# Patient Record
Sex: MALE | Race: WHITE | URBAN - METROPOLITAN AREA
[De-identification: names, ages, dates, MRNs, and addresses within clinical notes are randomized per-mention and may not be internally consistent; named-entity substitution may affect disease eponyms.]

---

## 2018-11-14 ENCOUNTER — FOLLOW UP ESTABLISHED (OUTPATIENT)
Dept: URBAN - METROPOLITAN AREA CLINIC 24 | Facility: CLINIC | Age: 63
End: 2018-11-14
Payer: MEDICARE

## 2018-11-14 DIAGNOSIS — H40.033 ANATOMICAL NARROW ANGLE, BILATERAL: Primary | ICD-10-CM

## 2018-11-14 DIAGNOSIS — H43.812 VITREOUS DEGENERATION, LEFT EYE: ICD-10-CM

## 2018-11-14 PROCEDURE — 92004 COMPRE OPH EXAM NEW PT 1/>: CPT | Performed by: OPTOMETRIST

## 2018-11-14 PROCEDURE — 92083 EXTENDED VISUAL FIELD XM: CPT | Performed by: OPTOMETRIST

## 2018-11-14 PROCEDURE — 92014 COMPRE OPH EXAM EST PT 1/>: CPT | Performed by: OPTOMETRIST

## 2018-11-14 PROCEDURE — 92133 CPTRZD OPH DX IMG PST SGM ON: CPT | Performed by: OPTOMETRIST

## 2018-11-14 RX ORDER — LATANOPROST 50 UG/ML
0.005 % SOLUTION OPHTHALMIC
Qty: 7.5 | Refills: 3 | Status: INACTIVE
Start: 2018-11-14 | End: 2022-03-28

## 2018-11-14 RX ORDER — LATANOPROST 50 UG/ML
0.005 % SOLUTION OPHTHALMIC
Qty: 7.5 | Refills: 3 | Status: INACTIVE
Start: 2018-11-14 | End: 2018-11-14

## 2018-11-14 ASSESSMENT — KERATOMETRY
OS: 41.42
OD: 41.70

## 2018-11-14 ASSESSMENT — INTRAOCULAR PRESSURE
OS: 24
OS: 21
OD: 24
OD: 22

## 2018-11-14 ASSESSMENT — VISUAL ACUITY
OD: 20/20
OS: 20/20

## 2018-12-18 ENCOUNTER — FOLLOW UP ESTABLISHED (OUTPATIENT)
Dept: URBAN - METROPOLITAN AREA CLINIC 24 | Facility: CLINIC | Age: 63
End: 2018-12-18
Payer: MEDICARE

## 2018-12-18 PROCEDURE — 92012 INTRM OPH EXAM EST PATIENT: CPT | Performed by: OPTOMETRIST

## 2018-12-18 ASSESSMENT — INTRAOCULAR PRESSURE
OD: 20
OS: 19
OD: 21
OS: 20

## 2019-06-11 ENCOUNTER — FOLLOW UP ESTABLISHED (OUTPATIENT)
Dept: URBAN - METROPOLITAN AREA CLINIC 24 | Facility: CLINIC | Age: 64
End: 2019-06-11
Payer: MEDICARE

## 2019-06-11 PROCEDURE — 92012 INTRM OPH EXAM EST PATIENT: CPT | Performed by: OPTOMETRIST

## 2019-06-11 ASSESSMENT — INTRAOCULAR PRESSURE
OS: 20
OD: 20

## 2019-12-17 ENCOUNTER — FOLLOW UP ESTABLISHED (OUTPATIENT)
Dept: URBAN - METROPOLITAN AREA CLINIC 24 | Facility: CLINIC | Age: 64
End: 2019-12-17
Payer: MEDICARE

## 2019-12-17 DIAGNOSIS — H43.393 OTHER VITREOUS OPACITIES, BILATERAL: ICD-10-CM

## 2019-12-17 PROCEDURE — 92014 COMPRE OPH EXAM EST PT 1/>: CPT | Performed by: OPTOMETRIST

## 2019-12-17 PROCEDURE — 92133 CPTRZD OPH DX IMG PST SGM ON: CPT | Performed by: OPTOMETRIST

## 2019-12-17 ASSESSMENT — INTRAOCULAR PRESSURE
OS: 20
OS: 19
OD: 20

## 2019-12-17 ASSESSMENT — KERATOMETRY
OD: 41.81
OS: 41.70

## 2020-09-10 ENCOUNTER — FOLLOW UP ESTABLISHED (OUTPATIENT)
Dept: URBAN - METROPOLITAN AREA CLINIC 26 | Facility: CLINIC | Age: 65
End: 2020-09-10
Payer: MEDICARE

## 2020-09-10 PROCEDURE — 92012 INTRM OPH EXAM EST PATIENT: CPT | Performed by: OPTOMETRIST

## 2020-09-10 PROCEDURE — 92083 EXTENDED VISUAL FIELD XM: CPT | Performed by: OPTOMETRIST

## 2020-09-10 ASSESSMENT — INTRAOCULAR PRESSURE
OD: 18
OS: 21

## 2021-03-10 ENCOUNTER — FOLLOW UP ESTABLISHED (OUTPATIENT)
Dept: URBAN - METROPOLITAN AREA CLINIC 26 | Facility: CLINIC | Age: 66
End: 2021-03-10
Payer: COMMERCIAL

## 2021-03-10 DIAGNOSIS — H25.813 COMBINED FORMS OF AGE-RELATED CATARACT, BILATERAL: ICD-10-CM

## 2021-03-10 PROCEDURE — 92134 CPTRZ OPH DX IMG PST SGM RTA: CPT | Performed by: OPTOMETRIST

## 2021-03-10 PROCEDURE — 92133 CPTRZD OPH DX IMG PST SGM ON: CPT | Performed by: OPTOMETRIST

## 2021-03-10 PROCEDURE — 99214 OFFICE O/P EST MOD 30 MIN: CPT | Performed by: OPTOMETRIST

## 2021-03-10 RX ORDER — LATANOPROST 50 UG/ML
0.005 % SOLUTION OPHTHALMIC
Qty: 7.5 | Refills: 3 | Status: INACTIVE
Start: 2021-03-10 | End: 2021-12-21

## 2021-03-10 ASSESSMENT — INTRAOCULAR PRESSURE
OS: 24
OD: 24
OS: 34
OD: 30

## 2021-03-10 ASSESSMENT — KERATOMETRY
OS: 41.50
OD: 41.50

## 2021-03-10 ASSESSMENT — VISUAL ACUITY
OD: 20/25
OS: 20/20

## 2021-03-25 ENCOUNTER — FOLLOW UP ESTABLISHED (OUTPATIENT)
Dept: URBAN - METROPOLITAN AREA CLINIC 26 | Facility: CLINIC | Age: 66
End: 2021-03-25
Payer: COMMERCIAL

## 2021-03-25 PROCEDURE — 99213 OFFICE O/P EST LOW 20 MIN: CPT | Performed by: OPTOMETRIST

## 2021-03-25 ASSESSMENT — INTRAOCULAR PRESSURE
OS: 15
OD: 14
OS: 18
OD: 16

## 2021-09-27 ENCOUNTER — OFFICE VISIT (OUTPATIENT)
Dept: URBAN - METROPOLITAN AREA CLINIC 26 | Facility: CLINIC | Age: 66
End: 2021-09-27
Payer: COMMERCIAL

## 2021-09-27 PROCEDURE — 92083 EXTENDED VISUAL FIELD XM: CPT | Performed by: OPTOMETRIST

## 2021-09-27 PROCEDURE — 99213 OFFICE O/P EST LOW 20 MIN: CPT | Performed by: OPTOMETRIST

## 2021-09-27 ASSESSMENT — INTRAOCULAR PRESSURE
OS: 18
OD: 17

## 2021-09-27 NOTE — IMPRESSION/PLAN
Impression: Anatomical narrow angle, bilateral
s/p LPI (patent OU) +family hx Plan: currently taking latanoprost qhs OU. IOP 17/18 RNFL (03/10/21) od: 72, superior thinning os: 82, wnl
VF (09/27/21) od: decreased reliability, superior rim defect os: nonspecific defect IOP well controlled and vf stable from previous with latanoprost qhs OU. 
continue present medication
rtc 6 months complete exam. rnfl oct.

## 2022-03-28 ENCOUNTER — OFFICE VISIT (OUTPATIENT)
Dept: URBAN - METROPOLITAN AREA CLINIC 26 | Facility: CLINIC | Age: 67
End: 2022-03-28
Payer: COMMERCIAL

## 2022-03-28 DIAGNOSIS — H52.4 PRESBYOPIA: ICD-10-CM

## 2022-03-28 DIAGNOSIS — H04.123 TEAR FILM INSUFFICIENCY OF BILATERAL LACRIMAL GLANDS: ICD-10-CM

## 2022-03-28 PROCEDURE — 92133 CPTRZD OPH DX IMG PST SGM ON: CPT | Performed by: OPTOMETRIST

## 2022-03-28 PROCEDURE — 92014 COMPRE OPH EXAM EST PT 1/>: CPT | Performed by: OPTOMETRIST

## 2022-03-28 PROCEDURE — 92134 CPTRZ OPH DX IMG PST SGM RTA: CPT | Performed by: OPTOMETRIST

## 2022-03-28 ASSESSMENT — INTRAOCULAR PRESSURE
OS: 18
OD: 19

## 2022-03-28 ASSESSMENT — VISUAL ACUITY
OS: 20/20
OD: 20/20

## 2022-03-28 ASSESSMENT — KERATOMETRY
OD: 41.75
OS: 41.50

## 2022-03-28 NOTE — IMPRESSION/PLAN
Impression: Anatomical narrow angle, bilateral
s/p LPI (patent OU) +family hx Plan: currently taking latanoprost qhs OU. IOP 19/18 RNFL (03/28/22) od: 74, superior borderline os: 83, wnl (stable from previous) VF (09/27/21) od: decreased reliability, superior rim defect os: nonspecific defect IOP well controlled and vf stable from previous with latanoprost qhs OU. 
continue present medication: latanoprost qhs OU
rtc 6 months IOP check and probable vf 24-2

## 2022-09-28 ENCOUNTER — OFFICE VISIT (OUTPATIENT)
Dept: URBAN - METROPOLITAN AREA CLINIC 26 | Facility: CLINIC | Age: 67
End: 2022-09-28
Payer: MEDICARE

## 2022-09-28 DIAGNOSIS — H52.4 PRESBYOPIA: ICD-10-CM

## 2022-09-28 DIAGNOSIS — H40.033 ANATOMICAL NARROW ANGLE, BILATERAL: Primary | ICD-10-CM

## 2022-09-28 PROCEDURE — 99213 OFFICE O/P EST LOW 20 MIN: CPT | Performed by: OPTOMETRIST

## 2022-09-28 PROCEDURE — 92083 EXTENDED VISUAL FIELD XM: CPT | Performed by: OPTOMETRIST

## 2022-09-28 ASSESSMENT — INTRAOCULAR PRESSURE
OD: 16
OS: 16

## 2022-09-28 NOTE — IMPRESSION/PLAN
Impression: Presbyopia: H52.4. Plan: pt requesting updated srx. srx from previous exam (03/28/22) available if pt would like to acquire.

## 2022-09-28 NOTE — IMPRESSION/PLAN
Impression: Anatomical narrow angle, bilateral
s/p LPI (patent OU) +family hx Plan: currently taking latanoprost qhs OU. 
today's IOP: 16/16 RNFL (03/28/22) od: 74, superior borderline os: 83, wnl (stable from previous) VF (09/28/22) od: full os: full IOP well controlled and vf stable from previous with latanoprost qhs OU. 
continue present medication: latanoprost qhs OU
rtc 6 months complete exam and rnfl oct.

## 2023-03-28 ENCOUNTER — OFFICE VISIT (OUTPATIENT)
Dept: URBAN - METROPOLITAN AREA CLINIC 26 | Facility: CLINIC | Age: 68
End: 2023-03-28
Payer: MEDICARE

## 2023-03-28 DIAGNOSIS — H04.123 TEAR FILM INSUFFICIENCY OF BILATERAL LACRIMAL GLANDS: ICD-10-CM

## 2023-03-28 DIAGNOSIS — H43.813 VITREOUS DEGENERATION, BILATERAL: ICD-10-CM

## 2023-03-28 DIAGNOSIS — H25.813 COMBINED FORMS OF AGE-RELATED CATARACT, BILATERAL: Primary | ICD-10-CM

## 2023-03-28 DIAGNOSIS — H40.033 ANATOMICAL NARROW ANGLE, BILATERAL: ICD-10-CM

## 2023-03-28 PROCEDURE — 92133 CPTRZD OPH DX IMG PST SGM ON: CPT | Performed by: OPTOMETRIST

## 2023-03-28 PROCEDURE — 92134 CPTRZ OPH DX IMG PST SGM RTA: CPT | Performed by: OPTOMETRIST

## 2023-03-28 PROCEDURE — 92014 COMPRE OPH EXAM EST PT 1/>: CPT | Performed by: OPTOMETRIST

## 2023-03-28 ASSESSMENT — VISUAL ACUITY
OS: 20/25
OD: 20/20

## 2023-03-28 ASSESSMENT — KERATOMETRY
OD: 41.25
OS: 41.38

## 2023-03-28 ASSESSMENT — INTRAOCULAR PRESSURE
OD: 16
OS: 17

## 2023-03-28 NOTE — IMPRESSION/PLAN
Impression: Anatomical narrow angle, bilateral
s/p LPI (patent OU) +family hx Plan: currently taking latanoprost qhs OU. 
today's IOP: 16/17 RNFL (03/28/23) od: 72, superior thinnin os: 83, wnl (stable from previous) VF (09/28/22) od: full os: full IOP well controlled and vf stable from previous with latanoprost qhs OU. 
continue present medication: latanoprost qhs OU
rtc 6 months IOP check and vf 24-2

## 2023-09-28 ENCOUNTER — OFFICE VISIT (OUTPATIENT)
Dept: URBAN - METROPOLITAN AREA CLINIC 26 | Facility: CLINIC | Age: 68
End: 2023-09-28
Payer: MEDICARE

## 2023-09-28 DIAGNOSIS — H40.033 ANATOMICAL NARROW ANGLE, BILATERAL: Primary | ICD-10-CM

## 2023-09-28 PROCEDURE — 99213 OFFICE O/P EST LOW 20 MIN: CPT | Performed by: OPTOMETRIST

## 2023-09-28 PROCEDURE — 92083 EXTENDED VISUAL FIELD XM: CPT | Performed by: OPTOMETRIST

## 2023-09-28 ASSESSMENT — INTRAOCULAR PRESSURE
OS: 24
OD: 21
OS: 22
OD: 23

## 2024-03-28 ENCOUNTER — OFFICE VISIT (OUTPATIENT)
Dept: URBAN - METROPOLITAN AREA CLINIC 26 | Facility: CLINIC | Age: 69
End: 2024-03-28
Payer: COMMERCIAL

## 2024-03-28 DIAGNOSIS — H04.123 TEAR FILM INSUFFICIENCY OF BILATERAL LACRIMAL GLANDS: ICD-10-CM

## 2024-03-28 DIAGNOSIS — H43.813 VITREOUS DEGENERATION, BILATERAL: ICD-10-CM

## 2024-03-28 DIAGNOSIS — H40.033 ANATOMICAL NARROW ANGLE, BILATERAL: Primary | ICD-10-CM

## 2024-03-28 DIAGNOSIS — H25.813 COMBINED FORMS OF AGE-RELATED CATARACT, BILATERAL: ICD-10-CM

## 2024-03-28 PROCEDURE — 92134 CPTRZ OPH DX IMG PST SGM RTA: CPT | Performed by: OPTOMETRIST

## 2024-03-28 PROCEDURE — 92014 COMPRE OPH EXAM EST PT 1/>: CPT | Performed by: OPTOMETRIST

## 2024-03-28 PROCEDURE — 92133 CPTRZD OPH DX IMG PST SGM ON: CPT | Performed by: OPTOMETRIST

## 2024-03-28 RX ORDER — DORZOLAMIDE HCL 20 MG/ML
2 % SOLUTION/ DROPS OPHTHALMIC
Qty: 7.5 | Refills: 1 | Status: ACTIVE
Start: 2024-03-28

## 2024-03-28 RX ORDER — LATANOPROST 50 UG/ML
0.005 % SOLUTION OPHTHALMIC
Qty: 7.5 | Refills: 3 | Status: ACTIVE
Start: 2024-03-28

## 2024-03-28 ASSESSMENT — VISUAL ACUITY
OD: 20/20
OS: 20/25

## 2024-03-28 ASSESSMENT — INTRAOCULAR PRESSURE
OD: 25
OD: 23
OS: 22

## 2024-03-28 ASSESSMENT — KERATOMETRY
OD: 41.63
OS: 41.75

## 2024-04-29 ENCOUNTER — OFFICE VISIT (OUTPATIENT)
Dept: URBAN - METROPOLITAN AREA CLINIC 26 | Facility: CLINIC | Age: 69
End: 2024-04-29
Payer: COMMERCIAL

## 2024-04-29 DIAGNOSIS — H40.033 ANATOMICAL NARROW ANGLE, BILATERAL: Primary | ICD-10-CM

## 2024-04-29 PROCEDURE — 99213 OFFICE O/P EST LOW 20 MIN: CPT | Performed by: OPTOMETRIST

## 2024-04-29 ASSESSMENT — INTRAOCULAR PRESSURE
OD: 16
OD: 20
OS: 16
OS: 20

## 2024-11-25 ENCOUNTER — OFFICE VISIT (OUTPATIENT)
Dept: URBAN - METROPOLITAN AREA CLINIC 26 | Facility: CLINIC | Age: 69
End: 2024-11-25
Payer: COMMERCIAL

## 2024-11-25 DIAGNOSIS — H40.033 ANATOMICAL NARROW ANGLE, BILATERAL: Primary | ICD-10-CM

## 2024-11-25 PROCEDURE — 99213 OFFICE O/P EST LOW 20 MIN: CPT | Performed by: OPTOMETRIST

## 2024-11-25 PROCEDURE — 92083 EXTENDED VISUAL FIELD XM: CPT | Performed by: OPTOMETRIST

## 2024-11-25 ASSESSMENT — INTRAOCULAR PRESSURE
OD: 22
OS: 22

## 2025-03-25 ENCOUNTER — OFFICE VISIT (OUTPATIENT)
Dept: URBAN - METROPOLITAN AREA CLINIC 26 | Facility: CLINIC | Age: 70
End: 2025-03-25
Payer: MEDICARE

## 2025-03-25 DIAGNOSIS — H25.813 COMBINED FORMS OF AGE-RELATED CATARACT, BILATERAL: ICD-10-CM

## 2025-03-25 DIAGNOSIS — H40.033 ANATOMICAL NARROW ANGLE, BILATERAL: ICD-10-CM

## 2025-03-25 DIAGNOSIS — H04.123 TEAR FILM INSUFFICIENCY OF BILATERAL LACRIMAL GLANDS: ICD-10-CM

## 2025-03-25 DIAGNOSIS — H43.813 VITREOUS DEGENERATION, BILATERAL: Primary | ICD-10-CM

## 2025-03-25 PROCEDURE — 92133 CPTRZD OPH DX IMG PST SGM ON: CPT | Performed by: OPTOMETRIST

## 2025-03-25 PROCEDURE — 92014 COMPRE OPH EXAM EST PT 1/>: CPT | Performed by: OPTOMETRIST

## 2025-03-25 PROCEDURE — 92134 CPTRZ OPH DX IMG PST SGM RTA: CPT | Performed by: OPTOMETRIST

## 2025-03-25 ASSESSMENT — KERATOMETRY
OD: 41.00
OS: 41.63

## 2025-03-25 ASSESSMENT — VISUAL ACUITY
OD: 20/30
OS: 20/30

## 2025-03-25 ASSESSMENT — INTRAOCULAR PRESSURE
OD: 19
OS: 19

## 2025-07-15 ENCOUNTER — OFFICE VISIT (OUTPATIENT)
Dept: URBAN - METROPOLITAN AREA CLINIC 26 | Facility: CLINIC | Age: 70
End: 2025-07-15
Payer: MEDICARE

## 2025-07-15 DIAGNOSIS — H04.123 TEAR FILM INSUFFICIENCY OF BILATERAL LACRIMAL GLANDS: ICD-10-CM

## 2025-07-15 DIAGNOSIS — H25.813 COMBINED FORMS OF AGE-RELATED CATARACT, BILATERAL: Primary | ICD-10-CM

## 2025-07-15 DIAGNOSIS — H40.033 ANATOMICAL NARROW ANGLE, BILATERAL: ICD-10-CM

## 2025-07-15 DIAGNOSIS — H43.813 VITREOUS DEGENERATION, BILATERAL: ICD-10-CM

## 2025-07-15 PROCEDURE — 99214 OFFICE O/P EST MOD 30 MIN: CPT | Performed by: OPTOMETRIST

## 2025-07-15 PROCEDURE — 92083 EXTENDED VISUAL FIELD XM: CPT | Performed by: OPTOMETRIST

## 2025-07-15 PROCEDURE — 92134 CPTRZ OPH DX IMG PST SGM RTA: CPT | Performed by: OPTOMETRIST

## 2025-07-15 ASSESSMENT — KERATOMETRY
OS: 41.75
OD: 41.25

## 2025-07-15 ASSESSMENT — VISUAL ACUITY
OD: 20/40
OS: 20/40

## 2025-07-15 ASSESSMENT — INTRAOCULAR PRESSURE
OD: 17
OS: 18

## 2025-07-17 ENCOUNTER — ADULT PHYSICAL (OUTPATIENT)
Facility: LOCATION | Age: 70
End: 2025-07-17
Payer: MEDICARE

## 2025-07-17 ENCOUNTER — TECH ONLY (OUTPATIENT)
Facility: LOCATION | Age: 70
End: 2025-07-17
Payer: MEDICARE

## 2025-07-17 DIAGNOSIS — Z01.818 ENCOUNTER FOR OTHER PREPROCEDURAL EXAMINATION: Primary | ICD-10-CM

## 2025-07-17 PROCEDURE — 99203 OFFICE O/P NEW LOW 30 MIN: CPT | Performed by: PHYSICIAN ASSISTANT

## 2025-07-30 ENCOUNTER — OFFICE VISIT (OUTPATIENT)
Dept: URBAN - METROPOLITAN AREA CLINIC 24 | Facility: CLINIC | Age: 70
End: 2025-07-30
Payer: MEDICARE

## 2025-07-30 DIAGNOSIS — H25.813 COMBINED FORMS OF AGE-RELATED CATARACT, BILATERAL: Primary | ICD-10-CM

## 2025-07-30 DIAGNOSIS — H52.203 BILATERAL ASTIGMATISM: ICD-10-CM

## 2025-07-30 DIAGNOSIS — H52.13 MYOPIA, BILATERAL: ICD-10-CM

## 2025-07-30 DIAGNOSIS — H40.1131 PRIMARY OPEN-ANGLE GLAUCOMA, MILD STAGE, BILATERAL: ICD-10-CM

## 2025-07-30 DIAGNOSIS — H43.813 VITREOUS DEGENERATION, BILATERAL: ICD-10-CM

## 2025-07-30 DIAGNOSIS — H40.033 ANATOMICAL NARROW ANGLE, BILATERAL: ICD-10-CM

## 2025-07-30 DIAGNOSIS — H17.13 CENTRAL CORNEAL OPACITY, BILATERAL: ICD-10-CM

## 2025-07-30 DIAGNOSIS — H04.123 TEAR FILM INSUFFICIENCY OF BILATERAL LACRIMAL GLANDS: ICD-10-CM

## 2025-07-30 PROCEDURE — 92020 GONIOSCOPY: CPT | Performed by: OPHTHALMOLOGY

## 2025-07-30 PROCEDURE — 99204 OFFICE O/P NEW MOD 45 MIN: CPT | Performed by: OPHTHALMOLOGY

## 2025-07-30 RX ORDER — DICLOFENAC SODIUM 1 MG/ML
0.1 % SOLUTION/ DROPS OPHTHALMIC
Qty: 5 | Refills: 2 | Status: ACTIVE
Start: 2025-07-30

## 2025-07-30 RX ORDER — PREDNISOLONE ACETATE 10 MG/ML
1 % SUSPENSION/ DROPS OPHTHALMIC
Qty: 5 | Refills: 2 | Status: ACTIVE
Start: 2025-07-30

## 2025-07-30 ASSESSMENT — INTRAOCULAR PRESSURE
OS: 20
OD: 21